# Patient Record
Sex: FEMALE | Race: WHITE
[De-identification: names, ages, dates, MRNs, and addresses within clinical notes are randomized per-mention and may not be internally consistent; named-entity substitution may affect disease eponyms.]

---

## 2018-01-04 ENCOUNTER — HOSPITAL ENCOUNTER (EMERGENCY)
Dept: HOSPITAL 80 - FED | Age: 68
Discharge: HOME | End: 2018-01-04
Payer: COMMERCIAL

## 2018-01-04 VITALS — OXYGEN SATURATION: 97 % | SYSTOLIC BLOOD PRESSURE: 135 MMHG | DIASTOLIC BLOOD PRESSURE: 67 MMHG

## 2018-01-04 VITALS — RESPIRATION RATE: 16 BRPM | HEART RATE: 60 BPM | TEMPERATURE: 98.1 F

## 2018-01-04 DIAGNOSIS — Z79.01: ICD-10-CM

## 2018-01-04 DIAGNOSIS — K21.9: Primary | ICD-10-CM

## 2018-01-04 DIAGNOSIS — E86.9: ICD-10-CM

## 2018-01-04 LAB
INR PPP: 1.7 (ref 0.83–1.16)
PLATELET # BLD: 263 10^3/UL (ref 150–400)
PROTHROMBIN TIME: 20.1 SEC (ref 12–15)

## 2018-01-04 PROCEDURE — 96360 HYDRATION IV INFUSION INIT: CPT

## 2018-01-04 PROCEDURE — 99285 EMERGENCY DEPT VISIT HI MDM: CPT

## 2018-01-04 PROCEDURE — 71275 CT ANGIOGRAPHY CHEST: CPT

## 2018-01-04 NOTE — EDPHY
H & P


HPI/ROS: 





CHIEF COMPLAINT:  Chest pain





HISTORY OF PRESENT ILLNESS:  The patient is a 67-year-old female with a history 

of pacemaker and AFib on Coumadin who comes to the emergency department for 

chest pain.  She is traveling from [place] to [place] for a wedding.  She 

states that about 13 hr ago she began having this pain.  It has been constant 

ever since.  She states that it improves when she takes a deep breath.  She has 

not had any leg pain or swelling.  She does not smoke.  She has never had pain 

like this before.  She has never had any coronary artery disease or procedures.

  The patient has some difficulty answering questioning.  She tends stopping 

the metal stents and needs prompting to finish.  She does not have any focal 

weakness or deficits.  She states that she does not feel confused.  I 

questioned her as why she stopped in [place] since that is out of the way and 

she told me it was on the way.





REVIEW OF SYSTEMS:


Constitutional:  denies: chills, fever, recent illness, recent injury


EENTM: denies: blurred vision, double vision, nose congestion


Respiratory: denies: cough, shortness of breath


Cardiac:  See HPI


Gastrointestinal/Abdominal: denies: abdominal pain, diarrhea, nausea, vomiting, 

blood streaked stools


Genitourinary: denies: dysuria, frequency, hematuria, pain


Musculoskeletal: denies: joint pain, muscle pain


Skin: denies: lesions, rash, jaundice, bruising


Neurological: denies: headache, numbness, paresthesia, tingling, dizziness, 

weakness


Hematologic/Lymphatic: denies: blood clots, easy bleeding, easy bruising


Immunologic/allergic: denies: HIV/AIDS, transplant








EXAM:


GENERAL:  Well-appearing, well-nourished and in no acute distress.


HEAD:  Atraumatic, normocephalic.


EYES:  Pupils equal round and reactive to light, extraocular movements intact, 

sclera anicteric, conjunctiva are normal.


ENT:  TMs normal, nares patent, oropharynx clear without exudates.  Moist 

mucous membranes.


NECK:  Normal range of motion, supple without lymphadenopathy or JVD.


LUNGS:  Breath sounds clear to auscultation bilaterally and equal.  No wheezes 

rales or rhonchi.


HEART:  Regular rate and rhythm without murmurs, rubs or gallops.


ABDOMEN:  Soft, nontender, normoactive bowel sounds.  No guarding, no rebound.  

No masses appreciated.


BACK:  No CVA tenderness, no spinal tenderness, step-offs or deformities


EXTREMITIES:  Normal range of motion, no pitting or edema.  No clubbing or 

cyanosis.


NEUROLOGICAL:  Cranial nerves II through XII grossly intact.  Normal speech, 

normal gait.  5/5 strength, normal movement in all extremities, normal sensation


PSYCH:  Normal mood, normal affect.


SKIN:  Warm, dry, normal turgor, no visible rashes or lesions.








Source: Patient, EMS


Exam Limitations: No limitations





- Medical/Surgical History


Hx Asthma: No


Hx Chronic Respiratory Disease: No


Hx Diabetes: No


Hx Cardiac Disease: No


Hx Renal Disease: No


Hx Cirrhosis: No





- Family History


Significant Family History: No pertinent family hx





- Social History


Alcohol Use: Sober


Drug Use: None


Constitutional: 


 Initial Vital Signs











Temperature (C)  36.7 C   01/04/18 14:16


 


Heart Rate  60   01/04/18 14:16


 


Respiratory Rate  16   01/04/18 14:16


 


Blood Pressure  100/59 L  01/04/18 14:16


 


O2 Sat (%)  95   01/04/18 14:16








 











O2 Delivery Mode               Room Air














Allergies/Adverse Reactions: 


 





Penicillins Allergy (Verified 01/04/18 14:18)


 








Home Medications: 














 Medication  Instructions  Recorded


 


Flecainide Acetate  01/04/18


 


Metoprolol    01/04/18


 


Pantoprazole Sodium [Protonix] 40 mg PO DAILY #30 tab 01/04/18


 


Warfarin Sodium  01/04/18














Medical Decision Making





- Diagnostics


EKG Interpretation: 





An EKG obtained and was read and documented in trace view.  Please see trace 

view for full reading and report.  Atrial paced rhythm, no acute ischemia 


Imaging Results: 


 Imaging Impressions





Chest/Thorax CTA  01/04/18 14:16


Impression: 


 


1. There is no CT evidence of pulmonary artery thromboembolic disease.


2. Mild cardiomegaly, without congestive heart failure.


3. Cholelithiasis. Consider sonography for further assessment, as clinically 

directed.


4. Small hiatal hernia.


 


Findings were discussed with MARGARETH MARTINEZ MD at 15:45, on 1/4/2018.


 











Imaging: Discussed imaging studies w/ On call Radiologist


ED Course/Re-evaluation: 





We discussed the patient's imaging and lab results which are reassuring 

specially in the face of 12 hr continuous symptoms.  She states that this does 

feel similar to heartburn.  I will treat her with a GI cocktail.  She is well 

appearing on exam.





Patient is feeling better after GI cocktail we discussed her hiatal hernia.  I 

will start her on an acids.  Will discharge at this time.  Discussed 

indications for returning.


Differential Diagnosis: 





Partial list of the Differential diagnosis considered include but were not 

limited to;  acute coronary disease, peptic ulcer disease, hiatal hernia and 

although unlikely based on the history and physical exam, I also considered 

dissection, infection, PE, aneurysm.  I discussed these differential diagnoses 

and the plan with the patient as well as the usual and expected course.  The 

patient understands that the diagnosis is provisional and that in medicine we 

are not always correct and that further workup is often warranted.  Usual and 

customary warnings were given.  All of the patient's questions were answered.  

The patient was instructed to return to the emergency department should the 

symptoms at all worsen or return, otherwise to followup with the physician as 

we discussed.





- Data Points


Laboratory Results: 


 Laboratory Results





 01/04/18 14:10 





 01/04/18 14:10 





 











  01/04/18 01/04/18 01/04/18





  14:10 14:10 14:10


 


WBC      12.46 10^3/uL H 10^3/uL





     (3.80-9.50) 


 


RBC      4.40 10^6/uL 10^6/uL





     (4.18-5.33) 


 


Hgb      14.1 g/dL g/dL





     (12.6-16.3) 


 


Hct      40.8 % %





     (38.0-47.0) 


 


MCV      92.7 fL fL





     (81.5-99.8) 


 


MCH      32.0 pg pg





     (27.9-34.1) 


 


MCHC      34.6 g/dL g/dL





     (32.4-36.7) 


 


RDW      13.0 % %





     (11.5-15.2) 


 


Plt Count      263 10^3/uL 10^3/uL





     (150-400) 


 


MPV      9.9 fL fL





     (8.7-11.7) 


 


Neut % (Auto)      65.9 % %





     (39.3-74.2) 


 


Lymph % (Auto)      25.4 % %





     (15.0-45.0) 


 


Mono % (Auto)      6.8 % %





     (4.5-13.0) 


 


Eos % (Auto)      1.0 % %





     (0.6-7.6) 


 


Baso % (Auto)      0.4 % %





     (0.3-1.7) 


 


Nucleat RBC Rel Count      0.0 % %





     (0.0-0.2) 


 


Absolute Neuts (auto)      8.20 10^3/uL H 10^3/uL





     (1.70-6.50) 


 


Absolute Lymphs (auto)      3.17 10^3/uL H 10^3/uL





     (1.00-3.00) 


 


Absolute Monos (auto)      0.85 10^3/uL H 10^3/uL





     (0.30-0.80) 


 


Absolute Eos (auto)      0.13 10^3/uL 10^3/uL





     (0.03-0.40) 


 


Absolute Basos (auto)      0.05 10^3/uL 10^3/uL





     (0.02-0.10) 


 


Absolute Nucleated RBC      0.00 10^3/uL 10^3/uL





     (0-0.01) 


 


Immature Gran %      0.5 % %





     (0.0-1.1) 


 


Immature Gran #      0.06 10^3/uL 10^3/uL





     (0.00-0.10) 


 


PT    20.1 SEC H SEC  





    (12.0-15.0)  


 


INR    1.70  H   





    (0.83-1.16)  


 


APTT    34.9 SEC SEC  





    (23.0-38.0)  


 


Sodium  143 mEq/L mEq/L    





   (134-144)   


 


Potassium  4.6 mEq/L mEq/L    





   (3.5-5.2)   


 


Chloride  104 mEq/L mEq/L    





   ()   


 


Carbon Dioxide  27 mEq/l mEq/l    





   (22-31)   


 


Anion Gap  12 mEq/L mEq/L    





   (8-16)   


 


BUN  22 mg/dL mg/dL    





   (7-23)   


 


Creatinine  0.9 mg/dL mg/dL    





   (0.6-1.0)   


 


Estimated GFR  > 60     





    


 


Glucose  84 mg/dL mg/dL    





   ()   


 


Calcium  9.9 mg/dL mg/dL    





   (8.5-10.4)   


 


Total Bilirubin  0.2 mg/dL mg/dL    





   (0.1-1.4)   


 


Conjugated Bilirubin  0.2 mg/dL mg/dL    





   (0.0-0.5)   


 


Unconjugated Bilirubin  0.0 mg/dL mg/dL    





   (0.0-1.1)   


 


AST  34 IU/L IU/L    





   (14-46)   


 


ALT  41 IU/L IU/L    





   (9-52)   


 


Alkaline Phosphatase  78 IU/L IU/L    





   ()   


 


Troponin I  < 0.012 ng/mL ng/mL    





   (0.000-0.034)   


 


Total Protein  7.3 g/dL g/dL    





   (6.3-8.2)   


 


Albumin  4.2 g/dL g/dL    





   (3.5-5.0)   


 


Lipase  324 IU/L H IU/L    





   ()   











Medications Given: 


 








Discontinued Medications





Al Hydroxide/Mg Hydroxide (Maalox Susp)  30 ml PO ONCE ONE


   Stop: 01/04/18 15:56


   Last Admin: 01/04/18 16:02 Dose:  30 ml


Hyoscyamine Sulfate (Levsin, Hyomax-Sl)  0.25 mg PO ONCE ONE


   Stop: 01/04/18 15:56


   Last Admin: 01/04/18 16:02 Dose:  0.25 mg


Sodium Chloride (Ns)  1,000 mls @ 0 mls/hr IV EDNOW ONE; Wide Open


   PRN Reason: Protocol


   Stop: 01/04/18 14:16


   Last Admin: 01/04/18 14:42 Dose:  1,000 mls


Lidocaine (Lidocaine 2% Viscous)  15 ml PO ONCE ONE


   Stop: 01/04/18 15:56


   Last Admin: 01/04/18 16:02 Dose:  15 ml








Departure





- Departure


Disposition: Home, Routine, Self-Care


Clinical Impression: 


Chest pain


Qualifiers:


 Chest pain type: unspecified Qualified Code(s): R07.9 - Chest pain, unspecified





GERD (gastroesophageal reflux disease)


Qualifiers:


 Esophagitis presence: esophagitis presence not specified Qualified Code(s): 

K21.9 - Gastro-esophageal reflux disease without esophagitis





Condition: Fair


Instructions:  Chest Pain (ED), Gastroesophageal Reflux Disease (ED)


Referrals: 


Patient,NotPresent [Unknown] - As per Instructions


Prescriptions: 


Pantoprazole Sodium [Protonix] 40 mg PO DAILY #30 tab